# Patient Record
Sex: FEMALE | Race: WHITE | NOT HISPANIC OR LATINO | Employment: OTHER | URBAN - METROPOLITAN AREA
[De-identification: names, ages, dates, MRNs, and addresses within clinical notes are randomized per-mention and may not be internally consistent; named-entity substitution may affect disease eponyms.]

---

## 2021-02-14 ENCOUNTER — APPOINTMENT (OUTPATIENT)
Dept: GENERAL RADIOLOGY | Facility: CLINIC | Age: 66
End: 2021-02-14
Attending: FAMILY MEDICINE
Payer: COMMERCIAL

## 2021-02-14 ENCOUNTER — HOSPITAL ENCOUNTER (EMERGENCY)
Facility: CLINIC | Age: 66
Discharge: HOME | End: 2021-02-14
Attending: FAMILY MEDICINE | Admitting: FAMILY MEDICINE
Payer: COMMERCIAL

## 2021-02-14 VITALS
BODY MASS INDEX: 26.49 KG/M2 | RESPIRATION RATE: 16 BRPM | WEIGHT: 159 LBS | DIASTOLIC BLOOD PRESSURE: 67 MMHG | HEART RATE: 71 BPM | SYSTOLIC BLOOD PRESSURE: 132 MMHG | HEIGHT: 65 IN

## 2021-02-14 DIAGNOSIS — S39.012A STRAIN OF LUMBAR REGION, INITIAL ENCOUNTER: ICD-10-CM

## 2021-02-14 DIAGNOSIS — S70.01XA CONTUSION OF RIGHT HIP, INITIAL ENCOUNTER: ICD-10-CM

## 2021-02-14 PROCEDURE — 99285 EMERGENCY DEPT VISIT HI MDM: CPT | Mod: 25 | Performed by: FAMILY MEDICINE

## 2021-02-14 PROCEDURE — 73502 X-RAY EXAM HIP UNI 2-3 VIEWS: CPT

## 2021-02-14 PROCEDURE — 72100 X-RAY EXAM L-S SPINE 2/3 VWS: CPT

## 2021-02-14 PROCEDURE — 99285 EMERGENCY DEPT VISIT HI MDM: CPT | Performed by: FAMILY MEDICINE

## 2021-02-14 PROCEDURE — 96376 TX/PRO/DX INJ SAME DRUG ADON: CPT | Performed by: FAMILY MEDICINE

## 2021-02-14 PROCEDURE — 250N000011 HC RX IP 250 OP 636: Performed by: FAMILY MEDICINE

## 2021-02-14 PROCEDURE — 96374 THER/PROPH/DIAG INJ IV PUSH: CPT | Performed by: FAMILY MEDICINE

## 2021-02-14 PROCEDURE — 96375 TX/PRO/DX INJ NEW DRUG ADDON: CPT | Performed by: FAMILY MEDICINE

## 2021-02-14 RX ORDER — HYDROCODONE BITARTRATE AND ACETAMINOPHEN 5; 325 MG/1; MG/1
1 TABLET ORAL EVERY 6 HOURS PRN
Qty: 10 TABLET | Refills: 0 | Status: SHIPPED | OUTPATIENT
Start: 2021-02-14 | End: 2021-02-17

## 2021-02-14 RX ORDER — KETOROLAC TROMETHAMINE 15 MG/ML
15 INJECTION, SOLUTION INTRAMUSCULAR; INTRAVENOUS ONCE
Status: COMPLETED | OUTPATIENT
Start: 2021-02-14 | End: 2021-02-14

## 2021-02-14 RX ORDER — ONDANSETRON 2 MG/ML
4 INJECTION INTRAMUSCULAR; INTRAVENOUS EVERY 30 MIN PRN
Status: DISCONTINUED | OUTPATIENT
Start: 2021-02-14 | End: 2021-02-14 | Stop reason: HOSPADM

## 2021-02-14 RX ORDER — CYCLOBENZAPRINE HCL 5 MG
5 TABLET ORAL 3 TIMES DAILY PRN
Qty: 20 TABLET | Refills: 0 | Status: SHIPPED | OUTPATIENT
Start: 2021-02-14 | End: 2021-02-20

## 2021-02-14 RX ORDER — HYDROMORPHONE HYDROCHLORIDE 1 MG/ML
0.5 INJECTION, SOLUTION INTRAMUSCULAR; INTRAVENOUS; SUBCUTANEOUS
Status: COMPLETED | OUTPATIENT
Start: 2021-02-14 | End: 2021-02-14

## 2021-02-14 RX ADMIN — HYDROMORPHONE HYDROCHLORIDE 0.5 MG: 1 INJECTION, SOLUTION INTRAMUSCULAR; INTRAVENOUS; SUBCUTANEOUS at 12:37

## 2021-02-14 RX ADMIN — ONDANSETRON 4 MG: 2 INJECTION INTRAMUSCULAR; INTRAVENOUS at 11:50

## 2021-02-14 RX ADMIN — HYDROMORPHONE HYDROCHLORIDE 0.5 MG: 1 INJECTION, SOLUTION INTRAMUSCULAR; INTRAVENOUS; SUBCUTANEOUS at 11:53

## 2021-02-14 RX ADMIN — HYDROMORPHONE HYDROCHLORIDE 0.5 MG: 1 INJECTION, SOLUTION INTRAMUSCULAR; INTRAVENOUS; SUBCUTANEOUS at 12:07

## 2021-02-14 RX ADMIN — KETOROLAC TROMETHAMINE 15 MG: 15 INJECTION, SOLUTION INTRAMUSCULAR; INTRAVENOUS at 11:50

## 2021-02-14 ASSESSMENT — MIFFLIN-ST. JEOR: SCORE: 1267.1

## 2021-02-14 NOTE — ED PROVIDER NOTES
"  History     Chief Complaint   Patient presents with     Fall     HPI  Bozena Chicas is a 65 year old female who presents with right hip and low back pain after a fall.  Patient fell down about 4 steps.  She slipped.  She is having more pain in the back part of her hip and then lower back area.  She feels like initially she got the wind knocked out of her and she had nausea initially but denies any now.  She states when she takes a deep breath makes her lower back hurt more.  She was able to stand when she arrived here.  Denies any extremity numbness or tingling.  Patient has not had previous problems with that hip.  Patient describes the pain as achy    Allergies:  No Known Allergies    Problem List:    There are no active problems to display for this patient.       Past Medical History:    Past Medical History:   Diagnosis Date     Migraines        Past Surgical History:    History reviewed. No pertinent surgical history.    Family History:    History reviewed. No pertinent family history.    Social History:  Marital Status:    Social History     Tobacco Use     Smoking status: Never Smoker     Smokeless tobacco: Never Used   Substance Use Topics     Alcohol use: Yes     Comment: minimal     Drug use: Never        Medications:    No current outpatient medications on file.        Review of Systems   All other systems reviewed and are negative.      Physical Exam   Height: 165.1 cm (5' 5\")  Weight: 72.1 kg (159 lb)      Physical Exam  Vitals signs and nursing note reviewed.   Constitutional:       General: She is not in acute distress.     Appearance: She is well-developed. She is not diaphoretic.   HENT:      Head: Normocephalic and atraumatic.      Nose: Nose normal.      Mouth/Throat:      Pharynx: No oropharyngeal exudate.   Eyes:      Conjunctiva/sclera: Conjunctivae normal.   Neck:      Musculoskeletal: Normal range of motion and neck supple.   Cardiovascular:      Rate and Rhythm: Normal rate and " regular rhythm.      Heart sounds: Normal heart sounds. No murmur. No friction rub.   Pulmonary:      Effort: Pulmonary effort is normal. No respiratory distress.      Breath sounds: Normal breath sounds. No stridor. No wheezing or rales.   Abdominal:      General: Bowel sounds are normal. There is no distension.      Palpations: Abdomen is soft. There is no mass.      Tenderness: There is no abdominal tenderness. There is no guarding.   Musculoskeletal:      Right hip: She exhibits decreased range of motion and tenderness. She exhibits normal strength, no bony tenderness, no swelling, no crepitus, no deformity and no laceration.      Lumbar back: She exhibits decreased range of motion, tenderness, bony tenderness and pain. She exhibits no swelling, no edema, no deformity, no laceration, no spasm and normal pulse.        Back:    Skin:     General: Skin is warm and dry.      Capillary Refill: Capillary refill takes less than 2 seconds.      Findings: No erythema.   Neurological:      Mental Status: She is alert and oriented to person, place, and time.   Psychiatric:         Judgment: Judgment normal.         ED Course        Procedures      Results for orders placed or performed during the hospital encounter of 02/14/21 (from the past 24 hour(s))   XR Pelvis w Hip Right 1 View    Narrative    EXAM: XR PELVIS AND HIP RIGHT 1 VIEW  LOCATION: [facility]  DATE/TIME: 2/14/2021 12:09 PM    INDICATION: Fall, right, posterior hip and back pain  COMPARISON: None.      Impression    IMPRESSION: No acute fracture or malalignment. Minimal to mild degenerative changes throughout the bony pelvis and lower lumbar spine.   XR Lumbar Spine 2/3 Views    Narrative    XR LUMBAR SPINE 2-3 VIEWS 2/14/2021 12:30 PM     COMPARISON: None    HISTORY: Fall. Low back pain and hip pain    FINDINGS: No fracture. Normal vertebral body heights and alignment.  Normal interbody spaces and facets. Normal visualized bony  pelvis.    NAV MARINA MD       Medications   HYDROmorphone (PF) (DILAUDID) injection 0.5 mg (has no administration in time range)   ondansetron (ZOFRAN) injection 4 mg (has no administration in time range)   ketorolac (TORADOL) injection 15 mg (has no administration in time range)     X-ray of the hip and lumbar spine were unremarkable.  Patient was able to stand and bear weight when she first got here.  Her pain is improved after the pain meds as noted above.  I think patient most likely has more of a contusion to her hip and a strain of her back.  We will discharge the patient home with a few hydrocodone and Flexeril.  Patient will follow-up if there is no improvement over the next few days    Assessments & Plan (with Medical Decision Making)  Right hip contusion, lumbar strain     I have reviewed the nursing notes.    I have reviewed the findings, diagnosis, plan and need for follow up with the patient.              2/14/2021   [facility] EMERGENCY DEPT     Sergei Reza MD  02/14/21 5208

## 2024-03-21 ENCOUNTER — APPOINTMENT (OUTPATIENT)
Dept: GENERAL RADIOLOGY | Facility: CLINIC | Age: 69
End: 2024-03-21
Attending: FAMILY MEDICINE
Payer: COMMERCIAL

## 2024-03-21 ENCOUNTER — HOSPITAL ENCOUNTER (EMERGENCY)
Facility: CLINIC | Age: 69
Discharge: HOME | End: 2024-03-21
Attending: PHYSICIAN ASSISTANT | Admitting: PHYSICIAN ASSISTANT
Payer: COMMERCIAL

## 2024-03-21 ENCOUNTER — APPOINTMENT (OUTPATIENT)
Dept: GENERAL RADIOLOGY | Facility: CLINIC | Age: 69
End: 2024-03-21
Attending: PHYSICIAN ASSISTANT
Payer: COMMERCIAL

## 2024-03-21 VITALS
TEMPERATURE: 97.7 F | RESPIRATION RATE: 20 BRPM | WEIGHT: 154.6 LBS | DIASTOLIC BLOOD PRESSURE: 92 MMHG | BODY MASS INDEX: 25.76 KG/M2 | HEART RATE: 69 BPM | OXYGEN SATURATION: 99 % | HEIGHT: 65 IN | SYSTOLIC BLOOD PRESSURE: 170 MMHG

## 2024-03-21 DIAGNOSIS — S52.502A CLOSED FRACTURE OF LEFT DISTAL RADIUS AND ULNA: ICD-10-CM

## 2024-03-21 DIAGNOSIS — S52.602A CLOSED FRACTURE OF LEFT DISTAL RADIUS AND ULNA: ICD-10-CM

## 2024-03-21 PROCEDURE — 250N000013 HC RX MED GY IP 250 OP 250 PS 637: Performed by: PHYSICIAN ASSISTANT

## 2024-03-21 PROCEDURE — 25605 CLTX DST RDL FX/EPHYS SEP W/: CPT | Mod: 54 | Performed by: PHYSICIAN ASSISTANT

## 2024-03-21 PROCEDURE — 99284 EMERGENCY DEPT VISIT MOD MDM: CPT | Mod: 57 | Performed by: PHYSICIAN ASSISTANT

## 2024-03-21 PROCEDURE — 99284 EMERGENCY DEPT VISIT MOD MDM: CPT | Mod: 25 | Performed by: PHYSICIAN ASSISTANT

## 2024-03-21 PROCEDURE — 250N000011 HC RX IP 250 OP 636: Performed by: PHYSICIAN ASSISTANT

## 2024-03-21 PROCEDURE — 73110 X-RAY EXAM OF WRIST: CPT | Mod: LT

## 2024-03-21 PROCEDURE — 25605 CLTX DST RDL FX/EPHYS SEP W/: CPT | Mod: LT | Performed by: PHYSICIAN ASSISTANT

## 2024-03-21 PROCEDURE — 73100 X-RAY EXAM OF WRIST: CPT | Mod: LT

## 2024-03-21 RX ORDER — BUPIVACAINE HYDROCHLORIDE 5 MG/ML
10 INJECTION, SOLUTION PERINEURAL ONCE
Status: COMPLETED | OUTPATIENT
Start: 2024-03-21 | End: 2024-03-21

## 2024-03-21 RX ORDER — ACETAMINOPHEN 325 MG/1
975 TABLET ORAL ONCE
Status: COMPLETED | OUTPATIENT
Start: 2024-03-21 | End: 2024-03-21

## 2024-03-21 RX ADMIN — ACETAMINOPHEN 975 MG: 325 TABLET, FILM COATED ORAL at 18:45

## 2024-03-21 RX ADMIN — BUPIVACAINE HYDROCHLORIDE 50 MG: 5 INJECTION, SOLUTION EPIDURAL; INTRACAUDAL; PERINEURAL at 19:34

## 2024-03-21 ASSESSMENT — ACTIVITIES OF DAILY LIVING (ADL)
ADLS_ACUITY_SCORE: 35

## 2024-03-21 ASSESSMENT — COLUMBIA-SUICIDE SEVERITY RATING SCALE - C-SSRS
2. HAVE YOU ACTUALLY HAD ANY THOUGHTS OF KILLING YOURSELF IN THE PAST MONTH?: NO
6. HAVE YOU EVER DONE ANYTHING, STARTED TO DO ANYTHING, OR PREPARED TO DO ANYTHING TO END YOUR LIFE?: NO
1. IN THE PAST MONTH, HAVE YOU WISHED YOU WERE DEAD OR WISHED YOU COULD GO TO SLEEP AND NOT WAKE UP?: NO

## 2024-03-21 NOTE — ED TRIAGE NOTES
Was playing soccer with her grandkids, fell and hurt her left wrist.      Triage Assessment (Adult)       Row Name 03/21/24 1826          Triage Assessment    Airway WDL WDL        Respiratory WDL    Respiratory WDL WDL        Skin Circulation/Temperature WDL    Skin Circulation/Temperature WDL WDL        Cardiac WDL    Cardiac WDL WDL        Peripheral/Neurovascular WDL    Peripheral Neurovascular WDL WDL        Cognitive/Neuro/Behavioral WDL    Cognitive/Neuro/Behavioral WDL WDL

## 2024-03-21 NOTE — ED PROVIDER NOTES
"  History     Chief Complaint   Patient presents with    Wrist Pain     HPI  Bozena Chicas is a 68 year old female who presents for evaluation of falling on outstretched hand just prior to arrival.  Noted immediate onset of left wrist pain and swelling.  Pain is currently rated 9 on a scale of 10.  Has not taken anything for the pain.  States that she has fractured her wrist 2 times in the past.  Denies any numbness or tingling.  Denies injury elsewhere in her body.  Denies any head or neck injury.  No LOC.    Allergies:  No Known Allergies    Problem List:    There are no problems to display for this patient.       Past Medical History:    Past Medical History:   Diagnosis Date    Migraines        Past Surgical History:    History reviewed. No pertinent surgical history.    Family History:    No family history on file.    Social History:  Marital Status:   [2]  Social History     Tobacco Use    Smoking status: Never    Smokeless tobacco: Never   Substance Use Topics    Alcohol use: Yes     Comment: minimal    Drug use: Never        Medications:    No current outpatient medications on file.        Review of Systems   All other systems reviewed and are negative.      Physical Exam   BP: (!) 170/92  Pulse: 69  Temp: 97.7  F (36.5  C)  Resp: 20  Height: 165.1 cm (5' 5\")  Weight: 70.1 kg (154 lb 9.6 oz) (actual)  SpO2: 99 %      Physical Exam  Vitals and nursing note reviewed.   Constitutional:       General: She is not in acute distress.     Appearance: She is not diaphoretic.   HENT:      Head: Normocephalic and atraumatic.      Right Ear: External ear normal.      Left Ear: External ear normal.      Nose: Nose normal.      Mouth/Throat:      Pharynx: No oropharyngeal exudate.   Eyes:      General: No scleral icterus.        Right eye: No discharge.         Left eye: No discharge.      Conjunctiva/sclera: Conjunctivae normal.      Pupils: Pupils are equal, round, and reactive to light.   Neck:      " Thyroid: No thyromegaly.   Cardiovascular:      Rate and Rhythm: Normal rate and regular rhythm.      Heart sounds: Normal heart sounds. No murmur heard.  Pulmonary:      Effort: Pulmonary effort is normal. No respiratory distress.      Breath sounds: Normal breath sounds. No wheezing or rales.   Chest:      Chest wall: No tenderness.   Abdominal:      General: Bowel sounds are normal. There is no distension.      Palpations: Abdomen is soft. There is no mass.      Tenderness: There is no abdominal tenderness. There is no guarding or rebound.   Musculoskeletal:         General: Normal range of motion.      Cervical back: Normal range of motion and neck supple.      Comments: Swelling over the distal radius area and tenderness noted.  Pain with any range of motion.  No obvious deformity.  Hand and fingers nontender.  Cap refill less than 2 seconds.  Sensation intact light touch.  No tenderness of the mid/proximal forearm, elbow, upper arm, or shoulder.   Lymphadenopathy:      Cervical: No cervical adenopathy.   Skin:     General: Skin is warm and dry.      Capillary Refill: Capillary refill takes less than 2 seconds.      Findings: No erythema or rash.   Neurological:      Mental Status: She is alert and oriented to person, place, and time.      Cranial Nerves: No cranial nerve deficit.   Psychiatric:         Behavior: Behavior normal.         Thought Content: Thought content normal.         ED [facility]    -Fracture    Date/Time: 3/21/2024 10:38 PM    Performed by: Lizandro Milligan PA-C  Authorized by: Lizandro Milligan PA-C    Risks, benefits and alternatives discussed.      INJURY      Injury location:  Forearm    Forearm injury location:  L forearm    Forearm fracture type: distal radius and ulnar styloid      PRE PROCEDURE ASSESSMENT      Neurological function: normal      Distal perfusion: normal      Range of motion: normal      ANESTHESIA (see MAR for  exact dosages)      Anesthesia method:  Nerve block    Block location:  Distal radius hematoma block    Block needle gauge:  25 G    Block anesthetic:  Bupivacaine 0.5% w/o epi    Block technique:  Hematoma block    Block injection procedure:  Anatomic landmarks identified, introduced needle, incremental injection and anatomic landmarks palpated    Block outcome:  Anesthesia achieved    PROCEDURE DETAILS:     Manipulation performed: yes      Skeletal traction used: yes      Reduction successful: yes      X-ray confirmed reduction: yes      Immobilization:  Sling and splint (Ortho-Glass splint was molded by myself to support the fracture appropriately with dorsal pressure)    Splint type:  Double sugar tong    Supplies used:  Ortho-Glass and cotton padding    POST PROCEDURE ASSESSMENT      Neurological function: normal      Distal perfusion: normal      Range of motion: unchanged        PROCEDURE    Patient Tolerance:  Patient tolerated the procedure well with no immediate complications                Critical Care time:  none               Results for orders placed or performed during the hospital encounter of 03/21/24 (from the past 24 hour(s))   XR Wrist Left G/E 3 Views    Narrative    EXAM: XR WRIST LEFT G/E 3 VIEWS  LOCATION: [facility]  DATE: 3/21/2024    INDICATION: Trauma, pain.    COMPARISON: None.      Impression    IMPRESSION: Fracture of the distal aspect of the left radius with slight dorsal angulation of the distal fracture fragment, but no cortical step-off along the articular margin. Nondisplaced fracture of the ulnar styloid. Mild degenerative change at the   STT joint and 1st CMC joint.     XR Wrist Left 2 Views    Narrative    EXAM: XR WRIST LEFT 2 VIEWS  LOCATION: [facility]  DATE: 3/21/2024    INDICATION: post reduction, distal radius ulna fx  COMPARISON: 03/21/2024      Impression    IMPRESSION: Improved alignment of the fracture  of the distal aspect of the left radius seen on the old study. Stable fracture of the ulnar styloid. No carpal fractures.       Medications   acetaminophen (TYLENOL) tablet 975 mg (975 mg Oral $Given 3/21/24 1845)   BUPivacaine (MARCAINE) 0.5% injection MDV (50 mg Intradermal $Given 3/21/24 1934)       Assessments & Plan (with Medical Decision Making)  Closed fracture of left distal radius and ulna     68 year old female presents for evaluation of a fall on outstretched hand just prior to arrival with immediate onset of left wrist pain and swelling.  Pain is rated 9 on a scale of 10.  On exam there is swelling but no obvious deformity of the distal radius area.  No tenderness about the remainder of the hand, forearm, elbow, or upper arm.  Pulses and sensation intact.  She was given Tylenol for pain.  X-rays displayed dorsally displaced distal radius fracture with ulnar styloid nondisplaced component.  I performed independent review of the x-rays and agree with the findings.  I printed a copy of the x-ray and reviewed with the patient.  I then consulted with orthopedics, Dr. Gifford, who recommended that I perform a hematoma block and try and reduce this into more of an acceptable anatomical location.  He wanted to see the patient the following day for recheck in his clinic.  Please see procedure note above.  Hematoma block was performed with great success.  Reduction performed by myself with the aid of the ED tech.  Ortho-Glass sugar-tong splint was placed by myself in the ED tech and then molded appropriately to support the fracture.  Patient tolerated this well.  CMS intact afterwards.  Postreduction x-ray confirms much improved anatomical alignment of the fracture.  Patient's pain was much improved as well with Tylenol therapy.  I offered her pain medication for her at home, but she declined.  She thought she would do fine with ibuprofen and Tylenol.  The patient was set up with a follow-up visit with Dr. Gifford  tomorrow to recheck her condition and to discuss long-term management plan of her fracture.  ED return instructions reviewed with her in detail.  She was in agreement and the patient was suitable for discharge.  Sling provided.     I have reviewed the nursing notes.    I have reviewed the findings, diagnosis, plan and need for follow up with the patient.           Medical Decision Making  The patient's presentation was of moderate complexity (an acute complicated injury).    The patient's evaluation involved:  ordering and/or review of 2 test(s) in this encounter (see separate area of note for details)  discussion of management or test interpretation with another health professional (see separate area of note for details)    The patient's management necessitated moderate risk (prescription drug management including medications given in the ED) and high risk (a decision regarding emergency major procedure (displaced fracture reduction)).        There are no discharge medications for this patient.      Final diagnoses:   Closed fracture of left distal radius and ulna     Disclaimer: This note consists of symbols derived from keyboarding, dictation and/or voice recognition software. As a result, there may be errors in the script that have gone undetected. Please consider this when interpreting information found in this chart.      3/21/2024   [facility] EMERGENCY DEPT       Lizandro Milligan PA-C  03/21/24 9010

## 2024-03-22 ENCOUNTER — OFFICE VISIT (OUTPATIENT)
Dept: ORTHOPEDICS | Facility: CLINIC | Age: 69
End: 2024-03-22
Payer: COMMERCIAL

## 2024-03-22 VITALS
HEIGHT: 65 IN | HEART RATE: 56 BPM | DIASTOLIC BLOOD PRESSURE: 66 MMHG | WEIGHT: 154 LBS | BODY MASS INDEX: 25.66 KG/M2 | SYSTOLIC BLOOD PRESSURE: 100 MMHG

## 2024-03-22 DIAGNOSIS — S52.602A CLOSED FRACTURE OF DISTAL ENDS OF LEFT RADIUS AND ULNA, INITIAL ENCOUNTER: Primary | ICD-10-CM

## 2024-03-22 DIAGNOSIS — S52.502A CLOSED FRACTURE OF DISTAL ENDS OF LEFT RADIUS AND ULNA, INITIAL ENCOUNTER: Primary | ICD-10-CM

## 2024-03-22 DIAGNOSIS — E58 INADEQUATE INTAKE OF CALCIUM AND VITAMIN D: ICD-10-CM

## 2024-03-22 DIAGNOSIS — E55.9 INADEQUATE INTAKE OF CALCIUM AND VITAMIN D: ICD-10-CM

## 2024-03-22 DIAGNOSIS — M84.68XA PATHOLOGICAL FRACTURE IN OTHER DISEASE, OTHER SITE, INITIAL ENCOUNTER FOR FRACTURE: ICD-10-CM

## 2024-03-22 LAB — VIT D+METAB SERPL-MCNC: 56 NG/ML (ref 20–50)

## 2024-03-22 PROCEDURE — 82306 VITAMIN D 25 HYDROXY: CPT | Performed by: ORTHOPAEDIC SURGERY

## 2024-03-22 PROCEDURE — 36415 COLL VENOUS BLD VENIPUNCTURE: CPT | Performed by: ORTHOPAEDIC SURGERY

## 2024-03-22 PROCEDURE — 99203 OFFICE O/P NEW LOW 30 MIN: CPT | Mod: 57 | Performed by: ORTHOPAEDIC SURGERY

## 2024-03-22 PROCEDURE — 25600 CLTX DST RDL FX/EPHYS SEP WO: CPT | Mod: LT | Performed by: ORTHOPAEDIC SURGERY

## 2024-03-22 RX ORDER — CALCIUM CARBONATE/VITAMIN D3 500MG-5MCG
1 POWDER IN PACKET (EA) ORAL DAILY
COMMUNITY

## 2024-03-22 RX ORDER — SUMATRIPTAN 100 MG/1
100 TABLET, FILM COATED ORAL
COMMUNITY
Start: 2024-03-15

## 2024-03-22 RX ORDER — ACETAMINOPHEN 500 MG
500-1000 TABLET ORAL EVERY 6 HOURS PRN
COMMUNITY

## 2024-03-22 RX ORDER — OMEGA-3 FATTY ACIDS/FISH OIL 300-1000MG
600 CAPSULE ORAL EVERY 6 HOURS PRN
COMMUNITY

## 2024-03-22 ASSESSMENT — PAIN SCALES - GENERAL: PAINLEVEL: SEVERE PAIN (7)

## 2024-03-22 NOTE — DISCHARGE INSTRUCTIONS
It was a pleasure working with you today!  I hope your condition improves rapidly!     Please elevate your arm above heart level is much as possible.  You can ice through the splint for 20 minutes every couple hours.  It is okay to use Tylenol 1000 mg every 6 hours as needed for pain.  You can also use ibuprofen 600 mg every 6 hours as needed for pain.  Please keep your appointment with the orthopedist for recheck of your fracture.

## 2024-03-22 NOTE — PROGRESS NOTES
S:  Sustained wrist fracture yesterday.  Reduced in ED.  Splinted.  Doing well in splint.       There is no problem list on file for this patient.           Past Medical History:   Diagnosis Date    Migraines           No past surgical history on file.         Social History     Tobacco Use    Smoking status: Never    Smokeless tobacco: Never   Substance Use Topics    Alcohol use: Yes     Comment: minimal          No family history on file.          No Known Allergies         No current outpatient medications on file.          Review Of Systems  Skin: negative  Eyes: negative  Ears/Nose/Throat: negative  Respiratory: No shortness of breath, dyspnea on exertion, cough, or hemoptysis    O: Physical Exam: sugar tong appears well moulded.  CMS intact to fingers.  No issues identified.    Lab: update Vit D    Images:EXAM: XR WRIST LEFT G/E 3 VIEWS  LOCATION: [facility]  DATE: 3/21/2024     INDICATION: Trauma, pain.     COMPARISON: None.                                                                      IMPRESSION: Fracture of the distal aspect of the left radius with slight dorsal angulation of the distal fracture fragment, but no cortical step-off along the articular margin. Nondisplaced fracture of the ulnar styloid. Mild degenerative change at the   STT joint and 1st CMC joint.     Narrative & Impression   EXAM: XR WRIST LEFT 2 VIEWS  LOCATION: [facility]  DATE: 3/21/2024     INDICATION: post reduction, distal radius ulna fx  COMPARISON: 03/21/2024                                                                      IMPRESSION: Improved alignment of the fracture of the distal aspect of the left radius seen on the old study. Stable fracture of the ulnar styloid. No carpal fractures            A:  Intra articular L distal radial fx/ ulnar fx      P:  Patient reduced in ER and splinted with adequate orientation, obtain images in splint next visit, then  remove splint in a week, place in short arm well moulded cast and repeat images after cast change.  Notify if exacerbation symptoms  Discussed Vit D and Calcium supplementation  Consider DEXA update             In addition to the above assessment and plan each active problem on Bozena's problem list was evaluated today. This included the questioning of Bozena for any medication problems. We will continue the current treatment plan for these active problems except as noted.

## 2024-03-22 NOTE — LETTER
3/22/2024         RE: Bozena Chicas  [id number] [address] [place] [id number]        Dear Colleague,    Thank you for referring your patient, Bozena Chicas, to the [facility]. Please see a copy of my visit note below.    S:  Sustained wrist fracture yesterday.  Reduced in ED.  Splinted.  Doing well in splint.       There is no problem list on file for this patient.           Past Medical History:   Diagnosis Date     Migraines           No past surgical history on file.         Social History     Tobacco Use     Smoking status: Never     Smokeless tobacco: Never   Substance Use Topics     Alcohol use: Yes     Comment: minimal          No family history on file.          No Known Allergies         No current outpatient medications on file.          Review Of Systems  Skin: negative  Eyes: negative  Ears/Nose/Throat: negative  Respiratory: No shortness of breath, dyspnea on exertion, cough, or hemoptysis    O: Physical Exam: sugar tong appears well moulded.  CMS intact to fingers.  No issues identified.    Lab: update Vit D    Images:EXAM: XR WRIST LEFT G/E 3 VIEWS  LOCATION: [facility]  DATE: 3/21/2024     INDICATION: Trauma, pain.     COMPARISON: None.                                                                      IMPRESSION: Fracture of the distal aspect of the left radius with slight dorsal angulation of the distal fracture fragment, but no cortical step-off along the articular margin. Nondisplaced fracture of the ulnar styloid. Mild degenerative change at the   STT joint and 1st CMC joint.     Narrative & Impression   EXAM: XR WRIST LEFT 2 VIEWS  LOCATION: [facility]  DATE: 3/21/2024     INDICATION: post reduction, distal radius ulna fx  COMPARISON: 03/21/2024                                                                      IMPRESSION: Improved alignment of the fracture of the distal aspect of the  left radius seen on the old study. Stable fracture of the ulnar styloid. No carpal fractures            A:  Intra articular L distal radial fx/ ulnar fx      P:  Patient reduced in ER and splinted with adequate orientation, obtain images in splint next visit, then remove splint in a week, place in short arm well moulded cast and repeat images after cast change.  Notify if exacerbation symptoms  Discussed Vit D and Calcium supplementation  Consider DEXA update             In addition to the above assessment and plan each active problem on Bozena's problem list was evaluated today. This included the questioning of Bozena for any medication problems. We will continue the current treatment plan for these active problems except as noted.        Again, thank you for allowing me to participate in the care of your patient.        Sincerely,        Benjie Gifford MD

## 2024-03-29 ENCOUNTER — ANCILLARY PROCEDURE (OUTPATIENT)
Dept: GENERAL RADIOLOGY | Facility: CLINIC | Age: 69
End: 2024-03-29
Attending: ORTHOPAEDIC SURGERY
Payer: COMMERCIAL

## 2024-03-29 ENCOUNTER — OFFICE VISIT (OUTPATIENT)
Dept: ORTHOPEDICS | Facility: CLINIC | Age: 69
End: 2024-03-29
Payer: COMMERCIAL

## 2024-03-29 VITALS — RESPIRATION RATE: 16 BRPM | BODY MASS INDEX: 25.66 KG/M2 | WEIGHT: 154 LBS | HEIGHT: 65 IN

## 2024-03-29 DIAGNOSIS — S52.602A CLOSED FRACTURE OF DISTAL ENDS OF LEFT RADIUS AND ULNA, INITIAL ENCOUNTER: ICD-10-CM

## 2024-03-29 DIAGNOSIS — S52.502A CLOSED FRACTURE OF DISTAL ENDS OF LEFT RADIUS AND ULNA, INITIAL ENCOUNTER: ICD-10-CM

## 2024-03-29 DIAGNOSIS — S52.602A CLOSED FRACTURE OF DISTAL ENDS OF LEFT RADIUS AND ULNA, INITIAL ENCOUNTER: Primary | ICD-10-CM

## 2024-03-29 DIAGNOSIS — S52.502A CLOSED FRACTURE OF DISTAL ENDS OF LEFT RADIUS AND ULNA, INITIAL ENCOUNTER: Primary | ICD-10-CM

## 2024-03-29 PROCEDURE — 99207 PR FRACTURE CARE IN GLOBAL PERIOD: CPT | Performed by: ORTHOPAEDIC SURGERY

## 2024-03-29 PROCEDURE — 73100 X-RAY EXAM OF WRIST: CPT | Mod: TC | Performed by: RADIOLOGY

## 2024-03-29 ASSESSMENT — PAIN SCALES - GENERAL: PAINLEVEL: MODERATE PAIN (4)

## 2024-03-29 NOTE — PATIENT INSTRUCTIONS
Thank you for choosing [facility]!      FOLLOW-UP  Dr. Gifford would like you to follow-up in 5-6 weeks for a cast removal and new imaging. Please stop by the  on your way out or call [phone] to schedule.     Casted today.

## 2024-03-29 NOTE — LETTER
3/29/2024         RE: Bozena Chicas  [address] [address] [place] [id number]        Dear Colleague,    Thank you for referring your patient, Bozena Chicas, to the [facility]. Please see a copy of my visit note below.    S:  Doing well in splint following fracture reduction.  DEXA scheduled.  Taking Vit D and calcium supplementation.       There is no problem list on file for this patient.           Past Medical History:   Diagnosis Date     Migraines           No past surgical history on file.         Social History     Tobacco Use     Smoking status: Never     Smokeless tobacco: Never   Substance Use Topics     Alcohol use: Yes     Comment: minimal          No family history on file.          No Known Allergies         Current Outpatient Medications   Medication Sig Dispense Refill     acetaminophen (TYLENOL) 500 MG tablet Take 500-1,000 mg by mouth every 6 hours as needed for mild pain       Calcium Carb-Cholecalciferol (OYSTER SHELL CALCIUM/VITAMIN D) 500-5 MG-MCG PACK Take 1 tablet by mouth daily       ibuprofen (ADVIL/MOTRIN) 200 MG capsule Take 600 mg by mouth every 6 hours as needed       omeprazole (PRILOSEC) 20 MG DR capsule Take 20 mg by mouth every morning       SUMAtriptan (IMITREX) 100 MG tablet Take 100 mg by mouth at onset of headache            Review Of Systems  Skin: negative  Eyes: negative  Ears/Nose/Throat: negative  Respiratory: No shortness of breath, dyspnea on exertion, cough, or hemoptysis    O: Physical Exam:  No pressure necrosis, no excoriation about margins of splint.  CMS intact to hand.  LUE.    Lab:Vit D 56    Images:Nearly anatomic orientation fracture fragments.  Some dorsal tilt, some radial angulation.  All acceptable.         A:    Stable L Distal radial fracture after closed reduction and placement in cast.    P:  Splint changed to short arm cast.  Post cast change images show adequate orientation fracture fragments.  See back 5-6  weeks, remove cast and obtain new images fracture site.    Notify if exacerbation symptoms  Elevate, ice  Continue Vit D and calcium supplementation  DEXA and review next visit             In addition to the above assessment and plan each active problem on Bozena's problem list was evaluated today. This included the questioning of Bozena for any medication problems. We will continue the current treatment plan for these active problems except as noted.        Again, thank you for allowing me to participate in the care of your patient.        Sincerely,        Benjie Gifford MD

## 2024-03-29 NOTE — PROGRESS NOTES
S:  Doing well in splint following fracture reduction.  DEXA scheduled.  Taking Vit D and calcium supplementation.       There is no problem list on file for this patient.           Past Medical History:   Diagnosis Date    Migraines           No past surgical history on file.         Social History     Tobacco Use    Smoking status: Never    Smokeless tobacco: Never   Substance Use Topics    Alcohol use: Yes     Comment: minimal          No family history on file.          No Known Allergies         Current Outpatient Medications   Medication Sig Dispense Refill    acetaminophen (TYLENOL) 500 MG tablet Take 500-1,000 mg by mouth every 6 hours as needed for mild pain      Calcium Carb-Cholecalciferol (OYSTER SHELL CALCIUM/VITAMIN D) 500-5 MG-MCG PACK Take 1 tablet by mouth daily      ibuprofen (ADVIL/MOTRIN) 200 MG capsule Take 600 mg by mouth every 6 hours as needed      omeprazole (PRILOSEC) 20 MG DR capsule Take 20 mg by mouth every morning      SUMAtriptan (IMITREX) 100 MG tablet Take 100 mg by mouth at onset of headache            Review Of Systems  Skin: negative  Eyes: negative  Ears/Nose/Throat: negative  Respiratory: No shortness of breath, dyspnea on exertion, cough, or hemoptysis    O: Physical Exam:  No pressure necrosis, no excoriation about margins of splint.  CMS intact to hand.  LUE.    Lab:Vit D 56    Images:Nearly anatomic orientation fracture fragments.  Some dorsal tilt, some radial angulation.  All acceptable.         A:    Stable L Distal radial fracture after closed reduction and placement in cast.    P:  Splint changed to short arm cast.  Post cast change images show adequate orientation fracture fragments.  See back 5-6 weeks, remove cast and obtain new images fracture site.    Notify if exacerbation symptoms  Elevate, ice  Continue Vit D and calcium supplementation  DEXA and review next visit             In addition to the above assessment and plan each active problem on Bozena's problem  list was evaluated today. This included the questioning of Bozena for any medication problems. We will continue the current treatment plan for these active problems except as noted.

## 2024-04-04 ENCOUNTER — HOSPITAL ENCOUNTER (OUTPATIENT)
Dept: BONE DENSITY | Facility: CLINIC | Age: 69
Discharge: HOME | End: 2024-04-04
Attending: ORTHOPAEDIC SURGERY | Admitting: ORTHOPAEDIC SURGERY
Payer: COMMERCIAL

## 2024-04-04 DIAGNOSIS — M84.68XA PATHOLOGICAL FRACTURE IN OTHER DISEASE, OTHER SITE, INITIAL ENCOUNTER FOR FRACTURE: ICD-10-CM

## 2024-04-04 DIAGNOSIS — S52.502A CLOSED FRACTURE OF DISTAL ENDS OF LEFT RADIUS AND ULNA, INITIAL ENCOUNTER: ICD-10-CM

## 2024-04-04 DIAGNOSIS — S52.602A CLOSED FRACTURE OF DISTAL ENDS OF LEFT RADIUS AND ULNA, INITIAL ENCOUNTER: ICD-10-CM

## 2024-04-04 PROCEDURE — 77080 DXA BONE DENSITY AXIAL: CPT

## 2024-04-13 ENCOUNTER — NURSE TRIAGE (OUTPATIENT)
Dept: NURSING | Facility: CLINIC | Age: 69
End: 2024-04-13
Payer: COMMERCIAL

## 2024-04-13 NOTE — TELEPHONE ENCOUNTER
"Triage Call:    Caller: Patient  Broke wrist 3 weeks ago, has had permanent cast for 2 weeks.  Her fingers are swollen and the cast feels tight. AT times they are a little on the \"white\" side and still a green color.    The coloring has not gotten worse and the puffiness has always been there, but hasn't gone down.  At times \"feels like the cast is so tight my arm is going to explode\".        Protocol Recommended Disposition: Be seen in the next 4 hours.      Caller verbalized understanding of instructions and questions answered.      Daisy Rutledge RN on 4/13/2024 at 3:04 PM      Reason for Disposition   [1] Increasing pain under cast AND [2] cast put on > 24 hours ago AND [3] not improved after elevation    Additional Information   Negative: Chest pain   Negative: Difficulty breathing   Negative: [1] SEVERE pain (e.g., excruciating, pain scale 8-10) under cast AND [2] not improved after pain medications and elevation   Negative: [1] SEVERE pain of fingers or toes AND [2] not improved after pain medications and elevation   Negative: [1] Numbness (loss of sensation) of fingers or toes AND [2] persists after 1 hour of elevation   Negative: [1] Tingling (pins and needles sensation) of fingers or toes AND [2] persists after 1 hour of elevation   Negative: Blueness or pallor of fingers or toes (compared to non-injured side)   Negative: Patient sounds very sick or weak to the triager    Protocols used: Cast Symptoms and [id number]    " Outgoing call to patient today 10/6/22 at 11:45am to the patient to go over detailed surgery instructions.     Discussed the following surgery directions with Cleo.    Surgery - Laparoscopic Hiatal Hernia Repair, possible Nissen, possible Gastropexy  Scheduled - Monday 10/31/22  [facility] - Friday 10/28/22 at 9:00am at [facility], [address] [place] [address].   PO appointment made on Monday 11/14/22 at 1:00pm with BROOK Berkowitz    STOP taking Ibuprofen, Advil, Motrin, Aleve, Naproxen and Nuprin 7 days prior to surgery.  STOP taking ALL prescription / OTC vitamins/supplements, minerals, fish oil and herbs 7 days prior to surgery.  STOP taking ALL diet pills 10 days prior to surgery.    Nothing to eat or drink after midnight the night prior to surgery.    Cleo stated that she is feeling better today and asked what she should do if she does feel better prior to surgery. She was instructed to contact Dr Braga's nurse to discuss what direction to go in.     MD's and assistant's [place] has been updated  Paperwork will be given to the nurse      Letters to be mailed to the patient's mailing address.  Verified address.  Letters will be put in the outgoing mail at the .      Cleo is agreeable to the surgery information and will contact our office with any questions or concerns prior to surgery and thanked me for the call.   she has been declining over the last several years and now has difficulty swallowing. Daughters do not want any feeding tube and understands the natural course.

## 2024-04-15 ENCOUNTER — TELEPHONE (OUTPATIENT)
Dept: ORTHOPEDICS | Facility: CLINIC | Age: 69
End: 2024-04-15
Payer: COMMERCIAL

## 2024-04-15 NOTE — TELEPHONE ENCOUNTER
Other: pt called, broke wrist 03/21, pt has cast on x 3 weeks, pt c/o of fingers being still swollen, no tingling. Would like for care team to call her back.      Could we send this information to you in [facility] or would you prefer to receive a phone call?:   Patient would prefer a phone call   Okay to leave a detailed message?: Yes at Cell number on file:    Telephone Information:   Mobile [phone]

## 2024-04-15 NOTE — TELEPHONE ENCOUNTER
Patient contacted in regards to questions about swelling in affected wrist. Patient states capillary refill is <3 seconds. Patient does report swelling that has not gone down since injury and reports mild pain with curling fingers but denies cast issues such as the cast being too tight around the fingers and arm. Patient states she has been elevating by laying her arm on a pillow but is encouraged to raise arm above heart when awake for as long as she is able and to use ice on fingers. Patient advised to call back in AM if condition does not change or go to ER if condition worsens overnight. Patient verbalizes understanding. No further action needed at this time.     Virginia Sanders RN   [facility]

## 2024-05-13 ENCOUNTER — ANCILLARY PROCEDURE (OUTPATIENT)
Dept: GENERAL RADIOLOGY | Facility: CLINIC | Age: 69
End: 2024-05-13
Attending: ORTHOPAEDIC SURGERY
Payer: COMMERCIAL

## 2024-05-13 ENCOUNTER — OFFICE VISIT (OUTPATIENT)
Dept: ORTHOPEDICS | Facility: CLINIC | Age: 69
End: 2024-05-13
Payer: COMMERCIAL

## 2024-05-13 VITALS — SYSTOLIC BLOOD PRESSURE: 132 MMHG | DIASTOLIC BLOOD PRESSURE: 77 MMHG | OXYGEN SATURATION: 98 % | HEART RATE: 72 BPM

## 2024-05-13 DIAGNOSIS — S52.502A CLOSED FRACTURE OF DISTAL ENDS OF LEFT RADIUS AND ULNA, INITIAL ENCOUNTER: ICD-10-CM

## 2024-05-13 DIAGNOSIS — S52.602A CLOSED FRACTURE OF DISTAL ENDS OF LEFT RADIUS AND ULNA, INITIAL ENCOUNTER: ICD-10-CM

## 2024-05-13 DIAGNOSIS — M81.0 OSTEOPOROSIS, UNSPECIFIED OSTEOPOROSIS TYPE, UNSPECIFIED PATHOLOGICAL FRACTURE PRESENCE: ICD-10-CM

## 2024-05-13 DIAGNOSIS — T88.7XXS UNSPECIFIED ADVERSE EFFECT OF DRUG OR MEDICAMENT, SEQUELA (CODE): ICD-10-CM

## 2024-05-13 DIAGNOSIS — M80.8AXA OTHER OSTEOPOROSIS WITH CURRENT PATHOLOGICAL FRACTURE, OTHER SITE, INITIAL ENCOUNTER FOR FRACTURE: Primary | ICD-10-CM

## 2024-05-13 PROCEDURE — 99207 PR FRACTURE CARE IN GLOBAL PERIOD: CPT | Performed by: ORTHOPAEDIC SURGERY

## 2024-05-13 PROCEDURE — 73100 X-RAY EXAM OF WRIST: CPT | Mod: TC | Performed by: RADIOLOGY

## 2024-05-13 ASSESSMENT — PAIN SCALES - GENERAL: PAINLEVEL: MILD PAIN (3)

## 2024-05-13 NOTE — LETTER
5/13/2024         RE: Bozena Chicas  [address] [address] [place] [id number]        Dear Colleague,    Thank you for referring your patient, Bozena Chicas, to the [facility]. Please see a copy of my visit note below.    S:  Patient without complaints. Has had DEXA.  Is taking Vit D and calcium supplementation.  Also wanted to discuss Left foot bunion.       There is no problem list on file for this patient.           Past Medical History:   Diagnosis Date     Distal radius fracture 03/21/2024     Migraines           History reviewed. No pertinent surgical history.         Social History     Tobacco Use     Smoking status: Never     Smokeless tobacco: Never   Substance Use Topics     Alcohol use: Yes     Comment: minimal          History reviewed. No pertinent family history.          No Known Allergies         Current Outpatient Medications   Medication Sig Dispense Refill     acetaminophen (TYLENOL) 500 MG tablet Take 500-1,000 mg by mouth every 6 hours as needed for mild pain       Calcium Carb-Cholecalciferol (OYSTER SHELL CALCIUM/VITAMIN D) 500-5 MG-MCG PACK Take 1 tablet by mouth daily       ibuprofen (ADVIL/MOTRIN) 200 MG capsule Take 600 mg by mouth every 6 hours as needed       omeprazole (PRILOSEC) 20 MG DR capsule Take 20 mg by mouth every morning       SUMAtriptan (IMITREX) 100 MG tablet Take 100 mg by mouth at onset of headache            Review Of Systems  Skin: negative  Eyes: negative  Ears/Nose/Throat: negative  Respiratory: No shortness of breath, dyspnea on exertion, cough, or hemoptysis    O: Physical Exam: Edema and resolving ecchymosis LUE.  CMS intact LUE.  No pressure necrosis, excoriation about margins of cast.     Lab: Vit D 56    Images:  Narrative & Impression   EXAM: DX AXIAL HIPS/SPINE  LOCATION: [facility]  DATE: 4/4/2024     INDICATION: BMD screening, baseline exam.  DEMOGRAPHICS: Age- 68 years. Gender- Female.  Menopausal status- Postmenopausal.  COMPARISON: No prior studies available on the current scanner.  TECHNIQUE: Dual-energy x-ray absorptiometry (DXA) performed with routine technique.     FINDINGS:     DXA RESULTS  -Lumbar Spine: L1-L4: BMD: 0.843 g/cm2. T-score: -2.8. Z-score: -1.3.  -RIGHT Hip Total: BMD: 0.638 g/cm2. T-score: -2.9. Z-score: -1.7.  -RIGHT Hip Femoral neck: BMD: 0.614 g/cm2. T-score: -3.0. Z-score: -1.5.  -LEFT Hip Total: BMD: 0.637 g/cm2. T-score: -2.9. Z-score: -1.7.  -LEFT Hip Femoral neck: BMD: 0.604 g/cm2. T-score: -3.1. Z-score: -1.6.     WHO T-SCORE CRITERIA  -Normal: T score at or above -1 SD  -Osteopenia: T score between -1 and -2.5 SD  -Osteoporosis: T score at or below -2.5 SD     The World Health Organization (WHO) criteria is applicable to perimenopausal females, postmenopausal females, and men aged 50 years or older.     INTERVAL CHANGE  -No prior studies available on the current scanner for comparison.       FRACTURE RISK  -The FRAX risk calculator is not applicable due to osteoporosis.     RECOMMENDATIONS  The patient's BMD is consistent with osteoporosis, and he/she is at increased fracture risk. If not currently being treated for low BMD, this would merit treatment according to the Bone Health and Osteoporosis Foundation.                                                                      IMPRESSION: OSTEOPOROSIS. T score meets the WHO criteria for osteoporosis at one or more measured sites. The risk of osteoporotic fracture increases approximately two-fold for each standard deviation decrease in T-score.         WRIST LEFT TWO VIEWS 3/29/2024 10:13 AM     HISTORY: Closed fracture of distal ends of left radius and ulna,  initial encounter.                                                                      IMPRESSION: Healing comminuted mildly displaced intra-articular  fracture of the distal left radius. Healing nondisplaced fracture of  the ulnar styloid process. Osteopenia.      Images of wrist fracture today without much change compared to previous study.    A:  osteoporosis, Healing comminuted mildly displaced intra-articular  fracture of the distal left radius. Healing nondisplaced fracture of  the ulnar styloid process.      P:  Patient will use a brace she has at home,   See PT/OT in [place] (at pt's request)  Continue vit D and calcium supplementation  Treat for osteoporosis/ bone health clinic  Will request Prolia and she will discuss with primary care  See back 6 weeks with new images  Notify if exacerbation symptoms             In addition to the above assessment and plan each active problem on Bozena's problem list was evaluated today. This included the questioning of Bozena for any medication problems. We will continue the current treatment plan for these active problems except as noted.        Again, thank you for allowing me to participate in the care of your patient.        Sincerely,        Benjie Gifford MD

## 2024-05-13 NOTE — PROGRESS NOTES
S:  Patient without complaints. Has had DEXA.  Is taking Vit D and calcium supplementation.  Also wanted to discuss Left foot bunion.       There is no problem list on file for this patient.           Past Medical History:   Diagnosis Date    Distal radius fracture 03/21/2024    Migraines           History reviewed. No pertinent surgical history.         Social History     Tobacco Use    Smoking status: Never    Smokeless tobacco: Never   Substance Use Topics    Alcohol use: Yes     Comment: minimal          History reviewed. No pertinent family history.          No Known Allergies         Current Outpatient Medications   Medication Sig Dispense Refill    acetaminophen (TYLENOL) 500 MG tablet Take 500-1,000 mg by mouth every 6 hours as needed for mild pain      Calcium Carb-Cholecalciferol (OYSTER SHELL CALCIUM/VITAMIN D) 500-5 MG-MCG PACK Take 1 tablet by mouth daily      ibuprofen (ADVIL/MOTRIN) 200 MG capsule Take 600 mg by mouth every 6 hours as needed      omeprazole (PRILOSEC) 20 MG DR capsule Take 20 mg by mouth every morning      SUMAtriptan (IMITREX) 100 MG tablet Take 100 mg by mouth at onset of headache            Review Of Systems  Skin: negative  Eyes: negative  Ears/Nose/Throat: negative  Respiratory: No shortness of breath, dyspnea on exertion, cough, or hemoptysis    O: Physical Exam: Edema and resolving ecchymosis LUE.  CMS intact LUE.  No pressure necrosis, excoriation about margins of cast.     Lab: Vit D 56    Images:  Narrative & Impression   EXAM: DX AXIAL HIPS/SPINE  LOCATION: [facility]  DATE: 4/4/2024     INDICATION: BMD screening, baseline exam.  DEMOGRAPHICS: Age- 68 years. Gender- Female. Menopausal status- Postmenopausal.  COMPARISON: No prior studies available on the current scanner.  TECHNIQUE: Dual-energy x-ray absorptiometry (DXA) performed with routine technique.     FINDINGS:     DXA RESULTS  -Lumbar Spine: L1-L4: BMD: 0.843 g/cm2. T-score: -2.8.  Z-score: -1.3.  -RIGHT Hip Total: BMD: 0.638 g/cm2. T-score: -2.9. Z-score: -1.7.  -RIGHT Hip Femoral neck: BMD: 0.614 g/cm2. T-score: -3.0. Z-score: -1.5.  -LEFT Hip Total: BMD: 0.637 g/cm2. T-score: -2.9. Z-score: -1.7.  -LEFT Hip Femoral neck: BMD: 0.604 g/cm2. T-score: -3.1. Z-score: -1.6.     WHO T-SCORE CRITERIA  -Normal: T score at or above -1 SD  -Osteopenia: T score between -1 and -2.5 SD  -Osteoporosis: T score at or below -2.5 SD     The World Health Organization (WHO) criteria is applicable to perimenopausal females, postmenopausal females, and men aged 50 years or older.     INTERVAL CHANGE  -No prior studies available on the current scanner for comparison.       FRACTURE RISK  -The FRAX risk calculator is not applicable due to osteoporosis.     RECOMMENDATIONS  The patient's BMD is consistent with osteoporosis, and he/she is at increased fracture risk. If not currently being treated for low BMD, this would merit treatment according to the Bone Health and Osteoporosis Foundation.                                                                      IMPRESSION: OSTEOPOROSIS. T score meets the WHO criteria for osteoporosis at one or more measured sites. The risk of osteoporotic fracture increases approximately two-fold for each standard deviation decrease in T-score.         WRIST LEFT TWO VIEWS 3/29/2024 10:13 AM     HISTORY: Closed fracture of distal ends of left radius and ulna,  initial encounter.                                                                      IMPRESSION: Healing comminuted mildly displaced intra-articular  fracture of the distal left radius. Healing nondisplaced fracture of  the ulnar styloid process. Osteopenia.     Images of wrist fracture today without much change compared to previous study.    A:  osteoporosis, Healing comminuted mildly displaced intra-articular  fracture of the distal left radius. Healing nondisplaced fracture of  the ulnar styloid process.      P:  Patient  will use a brace she has at home,   See PT/OT in [place] (at pt's request)  Continue vit D and calcium supplementation  Treat for osteoporosis/ bone health clinic  Will request Prolia and she will discuss with primary care  See back 6 weeks with new images  Notify if exacerbation symptoms             In addition to the above assessment and plan each active problem on Bozena's problem list was evaluated today. This included the questioning of Bozena for any medication problems. We will continue the current treatment plan for these active problems except as noted.

## 2024-05-20 ENCOUNTER — TELEPHONE (OUTPATIENT)
Dept: ORTHOPEDICS | Facility: CLINIC | Age: 69
End: 2024-05-20
Payer: COMMERCIAL

## 2024-05-20 NOTE — TELEPHONE ENCOUNTER
VM left for patient to call clinic back to schedule ordered labs. Patient needs these labs prior to Prolia injection.     Virginia Sanders RN   [facility]

## 2024-05-23 ENCOUNTER — THERAPY VISIT (OUTPATIENT)
Dept: OCCUPATIONAL THERAPY | Facility: CLINIC | Age: 69
End: 2024-05-23
Attending: ORTHOPAEDIC SURGERY
Payer: COMMERCIAL

## 2024-05-23 DIAGNOSIS — S52.502A CLOSED FRACTURE OF DISTAL ENDS OF LEFT RADIUS AND ULNA, INITIAL ENCOUNTER: Primary | ICD-10-CM

## 2024-05-23 DIAGNOSIS — S52.602A CLOSED FRACTURE OF DISTAL ENDS OF LEFT RADIUS AND ULNA, INITIAL ENCOUNTER: Primary | ICD-10-CM

## 2024-05-23 PROCEDURE — 97110 THERAPEUTIC EXERCISES: CPT | Mod: GO | Performed by: OCCUPATIONAL THERAPIST

## 2024-05-23 PROCEDURE — 97140 MANUAL THERAPY 1/> REGIONS: CPT | Mod: GO | Performed by: OCCUPATIONAL THERAPIST

## 2024-05-23 PROCEDURE — 97165 OT EVAL LOW COMPLEX 30 MIN: CPT | Mod: GO | Performed by: OCCUPATIONAL THERAPIST

## 2024-05-28 ENCOUNTER — THERAPY VISIT (OUTPATIENT)
Dept: OCCUPATIONAL THERAPY | Facility: CLINIC | Age: 69
End: 2024-05-28
Attending: ORTHOPAEDIC SURGERY
Payer: COMMERCIAL

## 2024-05-28 DIAGNOSIS — S52.502A CLOSED FRACTURE OF DISTAL ENDS OF LEFT RADIUS AND ULNA, INITIAL ENCOUNTER: Primary | ICD-10-CM

## 2024-05-28 DIAGNOSIS — S52.602A CLOSED FRACTURE OF DISTAL ENDS OF LEFT RADIUS AND ULNA, INITIAL ENCOUNTER: Primary | ICD-10-CM

## 2024-05-28 PROCEDURE — 97140 MANUAL THERAPY 1/> REGIONS: CPT | Mod: GO | Performed by: OCCUPATIONAL THERAPIST

## 2024-05-28 PROCEDURE — 97110 THERAPEUTIC EXERCISES: CPT | Mod: GO | Performed by: OCCUPATIONAL THERAPIST

## 2024-06-03 NOTE — TELEPHONE ENCOUNTER
Patient declines Prolia at this time due to side effects. Dr. Gifford aware.     Virginia Sanders, RN   [facility]

## 2024-06-06 ENCOUNTER — THERAPY VISIT (OUTPATIENT)
Dept: OCCUPATIONAL THERAPY | Facility: CLINIC | Age: 69
End: 2024-06-06
Attending: ORTHOPAEDIC SURGERY
Payer: COMMERCIAL

## 2024-06-06 DIAGNOSIS — S52.502A CLOSED FRACTURE OF DISTAL ENDS OF LEFT RADIUS AND ULNA, INITIAL ENCOUNTER: Primary | ICD-10-CM

## 2024-06-06 DIAGNOSIS — S52.602A CLOSED FRACTURE OF DISTAL ENDS OF LEFT RADIUS AND ULNA, INITIAL ENCOUNTER: Primary | ICD-10-CM

## 2024-06-06 PROCEDURE — 97140 MANUAL THERAPY 1/> REGIONS: CPT | Mod: GO | Performed by: OCCUPATIONAL THERAPIST

## 2024-06-06 PROCEDURE — 97110 THERAPEUTIC EXERCISES: CPT | Mod: GO | Performed by: OCCUPATIONAL THERAPIST

## 2024-06-10 ENCOUNTER — THERAPY VISIT (OUTPATIENT)
Dept: OCCUPATIONAL THERAPY | Facility: CLINIC | Age: 69
End: 2024-06-10
Attending: ORTHOPAEDIC SURGERY
Payer: COMMERCIAL

## 2024-06-10 DIAGNOSIS — S52.602A CLOSED FRACTURE OF DISTAL ENDS OF LEFT RADIUS AND ULNA, INITIAL ENCOUNTER: ICD-10-CM

## 2024-06-10 DIAGNOSIS — S52.502A CLOSED FRACTURE OF DISTAL ENDS OF LEFT RADIUS AND ULNA, INITIAL ENCOUNTER: ICD-10-CM

## 2024-06-10 PROCEDURE — 97110 THERAPEUTIC EXERCISES: CPT | Mod: GO | Performed by: OCCUPATIONAL THERAPIST

## 2024-06-10 PROCEDURE — 97140 MANUAL THERAPY 1/> REGIONS: CPT | Mod: GO | Performed by: OCCUPATIONAL THERAPIST

## 2024-06-10 PROCEDURE — 97112 NEUROMUSCULAR REEDUCATION: CPT | Mod: GO | Performed by: OCCUPATIONAL THERAPIST

## 2024-06-17 ENCOUNTER — THERAPY VISIT (OUTPATIENT)
Dept: OCCUPATIONAL THERAPY | Facility: CLINIC | Age: 69
End: 2024-06-17
Attending: ORTHOPAEDIC SURGERY
Payer: COMMERCIAL

## 2024-06-17 DIAGNOSIS — S52.502A CLOSED FRACTURE OF DISTAL ENDS OF LEFT RADIUS AND ULNA, INITIAL ENCOUNTER: Primary | ICD-10-CM

## 2024-06-17 DIAGNOSIS — S52.602A CLOSED FRACTURE OF DISTAL ENDS OF LEFT RADIUS AND ULNA, INITIAL ENCOUNTER: Primary | ICD-10-CM

## 2024-06-17 PROCEDURE — 97140 MANUAL THERAPY 1/> REGIONS: CPT | Mod: GO | Performed by: OCCUPATIONAL THERAPIST

## 2024-06-17 PROCEDURE — 97110 THERAPEUTIC EXERCISES: CPT | Mod: GO | Performed by: OCCUPATIONAL THERAPIST

## 2024-06-19 ENCOUNTER — OFFICE VISIT (OUTPATIENT)
Dept: ORTHOPEDICS | Facility: CLINIC | Age: 69
End: 2024-06-19
Payer: COMMERCIAL

## 2024-06-19 ENCOUNTER — ANCILLARY PROCEDURE (OUTPATIENT)
Dept: GENERAL RADIOLOGY | Facility: CLINIC | Age: 69
End: 2024-06-19
Attending: ORTHOPAEDIC SURGERY
Payer: COMMERCIAL

## 2024-06-19 VITALS
DIASTOLIC BLOOD PRESSURE: 58 MMHG | SYSTOLIC BLOOD PRESSURE: 115 MMHG | TEMPERATURE: 99.1 F | WEIGHT: 155.4 LBS | BODY MASS INDEX: 25.86 KG/M2

## 2024-06-19 DIAGNOSIS — M81.0 OSTEOPOROSIS, UNSPECIFIED OSTEOPOROSIS TYPE, UNSPECIFIED PATHOLOGICAL FRACTURE PRESENCE: ICD-10-CM

## 2024-06-19 DIAGNOSIS — M81.0 OSTEOPOROSIS, UNSPECIFIED OSTEOPOROSIS TYPE, UNSPECIFIED PATHOLOGICAL FRACTURE PRESENCE: Primary | ICD-10-CM

## 2024-06-19 PROCEDURE — 99207 PR FRACTURE CARE IN GLOBAL PERIOD: CPT | Performed by: ORTHOPAEDIC SURGERY

## 2024-06-19 PROCEDURE — 73100 X-RAY EXAM OF WRIST: CPT | Mod: TC | Performed by: RADIOLOGY

## 2024-06-19 ASSESSMENT — PAIN SCALES - GENERAL: PAINLEVEL: MODERATE PAIN (5)

## 2024-06-19 NOTE — LETTER
6/19/2024      Bozena Chicas  [address] [facility] [address]      Dear Colleague,    Thank you for referring your patient, Bozena Chicas, to the [facility]. Please see a copy of my visit note below.    S:         Patient Active Problem List   Diagnosis     Closed fracture of distal ends of left radius and ulna, initial encounter            Past Medical History:   Diagnosis Date     Distal radius fracture 03/21/2024     Migraines           No past surgical history on file.         Social History     Tobacco Use     Smoking status: Never     Smokeless tobacco: Never   Substance Use Topics     Alcohol use: Yes     Comment: minimal          No family history on file.          No Known Allergies         Current Outpatient Medications   Medication Sig Dispense Refill     acetaminophen (TYLENOL) 500 MG tablet Take 500-1,000 mg by mouth every 6 hours as needed for mild pain       Calcium Carb-Cholecalciferol (OYSTER SHELL CALCIUM/VITAMIN D) 500-5 MG-MCG PACK Take 1 tablet by mouth daily       ibuprofen (ADVIL/MOTRIN) 200 MG capsule Take 600 mg by mouth every 6 hours as needed       omeprazole (PRILOSEC) 20 MG DR capsule Take 20 mg by mouth every morning       SUMAtriptan (IMITREX) 100 MG tablet Take 100 mg by mouth at onset of headache            Review Of Systems  Skin: negative  Eyes: negative  Ears/Nose/Throat: negative  Respiratory: No shortness of breath, dyspnea on exertion, cough, or hemoptysis    O: Physical Exam:    Lab:vit D 56 (target 50-80)    Images:     EXAM: DX AXIAL HIPS/SPINE  LOCATION: [facility]  DATE: 4/4/2024     INDICATION: BMD screening, baseline exam.  DEMOGRAPHICS: Age- 68 years. Gender- Female. Menopausal status- Postmenopausal.  COMPARISON: No prior studies available on the current scanner.  TECHNIQUE: Dual-energy x-ray absorptiometry (DXA) performed with routine technique.     FINDINGS:     DXA RESULTS  -Lumbar Spine:  L1-L4: BMD: 0.843 g/cm2. T-score: -2.8. Z-score: -1.3.  -RIGHT Hip Total: BMD: 0.638 g/cm2. T-score: -2.9. Z-score: -1.7.  -RIGHT Hip Femoral neck: BMD: 0.614 g/cm2. T-score: -3.0. Z-score: -1.5.  -LEFT Hip Total: BMD: 0.637 g/cm2. T-score: -2.9. Z-score: -1.7.  -LEFT Hip Femoral neck: BMD: 0.604 g/cm2. T-score: -3.1. Z-score: -1.6.     WHO T-SCORE CRITERIA  -Normal: T score at or above -1 SD  -Osteopenia: T score between -1 and -2.5 SD  -Osteoporosis: T score at or below -2.5 SD     The World Health Organization (WHO) criteria is applicable to perimenopausal females, postmenopausal females, and men aged 50 years or older.     INTERVAL CHANGE  -No prior studies available on the current scanner for comparison.       FRACTURE RISK  -The FRAX risk calculator is not applicable due to osteoporosis.     RECOMMENDATIONS  The patient's BMD is consistent with osteoporosis, and he/she is at increased fracture risk. If not currently being treated for low BMD, this would merit treatment according to the Bone Health and Osteoporosis Foundation.                                                                      IMPRESSION: OSTEOPOROSIS. T score meets the WHO criteria for osteoporosis at one or more measured sites. The risk of osteoporotic fracture increases approximately two-fold for each standard deviation decrease in T-score.     A:  Osteoporosis with stable L distal radial fracture      P:  continue Vit D supplementation along with Calcium citrate (elemental calcium) 8060-0631 mg daily  Prolia  Continue to rehab hand/wrist  Notify if exacerbation symptoms  See back 6 mos  Repeat images wrist one year             In addition to the above assessment and plan each active problem on Bozena's problem list was evaluated today. This included the questioning of Bozena for any medication problems. We will continue the current treatment plan for these active problems except as noted.        Again, thank you for allowing me to  participate in the care of your patient.        Sincerely,        Benjie Gifford MD

## 2024-06-19 NOTE — PROGRESS NOTES
S:  Patient doing well and working with OT.  Decided against Prolia and thinks she well try Reclast. Will discuss with primary care.         Patient Active Problem List   Diagnosis    Closed fracture of distal ends of left radius and ulna, initial encounter            Past Medical History:   Diagnosis Date    Distal radius fracture 03/21/2024    Migraines           No past surgical history on file.         Social History     Tobacco Use    Smoking status: Never    Smokeless tobacco: Never   Substance Use Topics    Alcohol use: Yes     Comment: minimal          No family history on file.          No Known Allergies         Current Outpatient Medications   Medication Sig Dispense Refill    acetaminophen (TYLENOL) 500 MG tablet Take 500-1,000 mg by mouth every 6 hours as needed for mild pain      Calcium Carb-Cholecalciferol (OYSTER SHELL CALCIUM/VITAMIN D) 500-5 MG-MCG PACK Take 1 tablet by mouth daily      ibuprofen (ADVIL/MOTRIN) 200 MG capsule Take 600 mg by mouth every 6 hours as needed      omeprazole (PRILOSEC) 20 MG DR capsule Take 20 mg by mouth every morning      SUMAtriptan (IMITREX) 100 MG tablet Take 100 mg by mouth at onset of headache            Review Of Systems  Skin: negative  Eyes: negative  Ears/Nose/Throat: negative  Respiratory: No shortness of breath, dyspnea on exertion, cough, or hemoptysis    O: Physical Exam:Adequate wrist motion.  Some swelling yet.  CMS intact.    Lab:vit D 56 (target 50-80)    Images:     EXAM: DX AXIAL HIPS/SPINE  LOCATION: [facility]  DATE: 4/4/2024     INDICATION: BMD screening, baseline exam.  DEMOGRAPHICS: Age- 68 years. Gender- Female. Menopausal status- Postmenopausal.  COMPARISON: No prior studies available on the current scanner.  TECHNIQUE: Dual-energy x-ray absorptiometry (DXA) performed with routine technique.     FINDINGS:     DXA RESULTS  -Lumbar Spine: L1-L4: BMD: 0.843 g/cm2. T-score: -2.8. Z-score: -1.3.  -RIGHT Hip Total:  BMD: 0.638 g/cm2. T-score: -2.9. Z-score: -1.7.  -RIGHT Hip Femoral neck: BMD: 0.614 g/cm2. T-score: -3.0. Z-score: -1.5.  -LEFT Hip Total: BMD: 0.637 g/cm2. T-score: -2.9. Z-score: -1.7.  -LEFT Hip Femoral neck: BMD: 0.604 g/cm2. T-score: -3.1. Z-score: -1.6.     WHO T-SCORE CRITERIA  -Normal: T score at or above -1 SD  -Osteopenia: T score between -1 and -2.5 SD  -Osteoporosis: T score at or below -2.5 SD     The World Health Organization (WHO) criteria is applicable to perimenopausal females, postmenopausal females, and men aged 50 years or older.     INTERVAL CHANGE  -No prior studies available on the current scanner for comparison.       FRACTURE RISK  -The FRAX risk calculator is not applicable due to osteoporosis.     RECOMMENDATIONS  The patient's BMD is consistent with osteoporosis, and he/she is at increased fracture risk. If not currently being treated for low BMD, this would merit treatment according to the [employer] Health [employer] Osteopor[employer].                                                                      IMPRESSION: OSTEOPOROSIS. T score meets the WHO criteria for osteoporosis at one or more measured sites. The risk of osteoporotic fracture increases approximately two-fold for each standard deviation decrease in T-score.     A:  Osteoporosis with stable L distal radial fracture      P:  continue Vit D supplementation along with Calcium citrate (elemental calcium) 6118-3613 mg daily  Prolia suggested but she'd rather try reclast  Continue to rehab hand/wrist  Notify if exacerbation symptoms  See back one year unless issues then sooner  Repeat images wrist one year             In addition to the above assessment and plan each active problem on Bozena's problem list was evaluated today. This included the questioning of Bozena for any medication problems. We will continue the current treatment plan for these active problems except as noted.

## 2024-07-01 ENCOUNTER — THERAPY VISIT (OUTPATIENT)
Dept: OCCUPATIONAL THERAPY | Facility: CLINIC | Age: 69
End: 2024-07-01
Attending: ORTHOPAEDIC SURGERY
Payer: COMMERCIAL

## 2024-07-01 DIAGNOSIS — S52.502A CLOSED FRACTURE OF DISTAL ENDS OF LEFT RADIUS AND ULNA, INITIAL ENCOUNTER: Primary | ICD-10-CM

## 2024-07-01 DIAGNOSIS — S52.602A CLOSED FRACTURE OF DISTAL ENDS OF LEFT RADIUS AND ULNA, INITIAL ENCOUNTER: Primary | ICD-10-CM

## 2024-07-01 PROCEDURE — 97110 THERAPEUTIC EXERCISES: CPT | Mod: GO | Performed by: OCCUPATIONAL THERAPIST

## 2024-07-01 PROCEDURE — 97140 MANUAL THERAPY 1/> REGIONS: CPT | Mod: GO | Performed by: OCCUPATIONAL THERAPIST

## 2024-07-01 NOTE — PROGRESS NOTES
PLAN  Continue therapy per current plan of care.    Beginning/End Dates of Progress Note Reporting Period:  5/23/24 to 07/01/2024    Referring Provider:  Benjie HARTMANI [age]       07/01/24 0500   Appointment Info   Treating Provider Rosalina Blackwell, OTR/L, CHT   Total/Authorized Visits 12   Visits Used 6   Medical Diagnosis Closed fracture of distal ends of left radius and ulna, initial encounter   OT Tx Diagnosis Stiffness. Pain. Limited ROM.   Quick Add  Certification   Progress Note/Certification   Start Of Care Date 05/23/24   Onset of Illness/Injury or Date of Surgery 05/13/24   Therapy Frequency 1x/wk   Predicted Duration 12 weeks   Certification date from 05/23/24   Certification date to 08/15/24   KX Modifier Statement I certify the need for these services furnished under this plan of treatment and while under my care.  (Physician co-signature of this document indicates review and certification of the therapy plan)   Progress Note Due Date 08/15/24   Progress Note Completed Date 07/01/24   OT Goal 1   Goal Description Pt will increase AROM to 60/60 E/F for increased functional engagement in ADLs.   Rationale In order to maximize safety and independence with ADL/IADLs   Goal Progress not met, making progress towards   Target Date 08/15/24   Subjective Report   Subjective Report Played piano and just pain up the side of the arm.   Objective Measures   Objective Measures Hand Obj Measures;Objective Measure 1   Hand Objective Measures ROM   ROM Wrist ROM;Forearm ROM   Objective Measure 1   Objective Measure DWC edema   Details 16.7   Forearm ROM   Supination 90   Pronation  85   Wrist ROM    Extension 60  (65 post)   Flexion 30  (35 post. same)   Radial Deviation (RD) 15   Ulnar Deviation (UD) 20   Treatment Interventions (OT)   Interventions Manual Therapy;Therapeutic Procedure/Exercise;Neuromuscular Re-education   Neuromuscular Re-education   Neuro Re-ed 1 Proprioception   Neuro Re-ed 1 - Details  hammer, 1# TB  (rotation and DTM)   Skilled Intervention graded input into the wrist with controlled movement/motion   Patient Response/Progress well   Therapeutic Procedure/Exercise   Therapeutic Procedure: strength, endurance, ROM, flexibillity minutes (44866) 30   Therapeutic Procedures Ther Proc 2;Ther Proc 3;Ther Proc 4;Ther Proc 5;Ther Proc 6   Ther Proc 1 PROM   Ther Proc 1 - Details Wrist E/F/R/U   Ther Proc 2 Tendon glides   Ther Proc 2 - Details hook and flat, A/PROM   Ther Proc 4 MWM   Ther Proc 4 - Details table edge with facilitated mobility of PCR   Ther Proc 5 Isometrics   Ther Proc 5 - Details WE and RD with YTB  (reviewed for technique/mechanics)   Skilled Intervention graded activity to meet patient at current level and modify as needed   Patient Response/Progress well   Ther Proc 6 putty   Ther Proc 6 - Details , pinch, add, abd   Manual Therapy   Manual Therapy Minutes (66101) 15   Manual Therapy Manual Therapy 2   Manual Therapy 1 MEM, STM   Manual Therapy 1 - Details tissue mobilization throughout the FA to reduce edema and elongation to tissues.  (focus on flexion motion)   Manual Therapy 2 JMs   Manual Therapy 2 - Details individual and through PCR   Skilled Intervention anatomical knowledge of structures to reduce edema ad create length of tissues   Patient Response/Progress well   Education   Learner/Method Patient   Plan   Home program PTRx   Plan for next session ROM, PROM, stability, edema control. strength   Comments   Comments Pt with continued improvements in the wrist and hand, reports most discomfort in the hand vs wrist. She is increasing overall functional use of the hand with ADLs and IADLs, though she does reports increased edema at the end of the day. She conts to benefit from ongoing skilled therapy to address continued deficits in order to return to pain free independent function.   Total Session Time   Timed Code Treatment Minutes 45   Total Treatment Time (sum of timed  and untimed services) 45

## 2024-07-08 ENCOUNTER — THERAPY VISIT (OUTPATIENT)
Dept: OCCUPATIONAL THERAPY | Facility: CLINIC | Age: 69
End: 2024-07-08
Attending: ORTHOPAEDIC SURGERY
Payer: COMMERCIAL

## 2024-07-08 DIAGNOSIS — S52.602A CLOSED FRACTURE OF DISTAL ENDS OF LEFT RADIUS AND ULNA, INITIAL ENCOUNTER: Primary | ICD-10-CM

## 2024-07-08 DIAGNOSIS — S52.502A CLOSED FRACTURE OF DISTAL ENDS OF LEFT RADIUS AND ULNA, INITIAL ENCOUNTER: Primary | ICD-10-CM

## 2024-07-08 PROCEDURE — 97140 MANUAL THERAPY 1/> REGIONS: CPT | Mod: GO | Performed by: OCCUPATIONAL THERAPIST

## 2024-07-08 PROCEDURE — 97110 THERAPEUTIC EXERCISES: CPT | Mod: GO | Performed by: OCCUPATIONAL THERAPIST

## 2024-07-15 ENCOUNTER — THERAPY VISIT (OUTPATIENT)
Dept: OCCUPATIONAL THERAPY | Facility: CLINIC | Age: 69
End: 2024-07-15
Attending: ORTHOPAEDIC SURGERY
Payer: COMMERCIAL

## 2024-07-15 DIAGNOSIS — S52.502A CLOSED FRACTURE OF DISTAL ENDS OF LEFT RADIUS AND ULNA, INITIAL ENCOUNTER: Primary | ICD-10-CM

## 2024-07-15 DIAGNOSIS — S52.602A CLOSED FRACTURE OF DISTAL ENDS OF LEFT RADIUS AND ULNA, INITIAL ENCOUNTER: Primary | ICD-10-CM

## 2024-07-15 PROCEDURE — 97110 THERAPEUTIC EXERCISES: CPT | Mod: GO | Performed by: OCCUPATIONAL THERAPIST

## 2024-07-15 PROCEDURE — 97140 MANUAL THERAPY 1/> REGIONS: CPT | Mod: GO | Performed by: OCCUPATIONAL THERAPIST

## 2024-07-25 ENCOUNTER — THERAPY VISIT (OUTPATIENT)
Dept: OCCUPATIONAL THERAPY | Facility: CLINIC | Age: 69
End: 2024-07-25
Attending: ORTHOPAEDIC SURGERY
Payer: COMMERCIAL

## 2024-07-25 DIAGNOSIS — S52.502A CLOSED FRACTURE OF DISTAL ENDS OF LEFT RADIUS AND ULNA, INITIAL ENCOUNTER: Primary | ICD-10-CM

## 2024-07-25 DIAGNOSIS — S52.602A CLOSED FRACTURE OF DISTAL ENDS OF LEFT RADIUS AND ULNA, INITIAL ENCOUNTER: Primary | ICD-10-CM

## 2024-07-25 PROCEDURE — 97140 MANUAL THERAPY 1/> REGIONS: CPT | Mod: GO | Performed by: OCCUPATIONAL THERAPIST

## 2024-07-25 PROCEDURE — 97110 THERAPEUTIC EXERCISES: CPT | Mod: GO | Performed by: OCCUPATIONAL THERAPIST

## 2024-07-31 ENCOUNTER — THERAPY VISIT (OUTPATIENT)
Dept: OCCUPATIONAL THERAPY | Facility: CLINIC | Age: 69
End: 2024-07-31
Attending: ORTHOPAEDIC SURGERY
Payer: COMMERCIAL

## 2024-07-31 DIAGNOSIS — S52.502A CLOSED FRACTURE OF DISTAL ENDS OF LEFT RADIUS AND ULNA, INITIAL ENCOUNTER: Primary | ICD-10-CM

## 2024-07-31 DIAGNOSIS — S52.602A CLOSED FRACTURE OF DISTAL ENDS OF LEFT RADIUS AND ULNA, INITIAL ENCOUNTER: Primary | ICD-10-CM

## 2024-07-31 PROCEDURE — 97110 THERAPEUTIC EXERCISES: CPT | Mod: GO | Performed by: OCCUPATIONAL THERAPIST

## 2024-07-31 PROCEDURE — 97140 MANUAL THERAPY 1/> REGIONS: CPT | Mod: GO | Performed by: OCCUPATIONAL THERAPIST

## 2024-08-15 ENCOUNTER — THERAPY VISIT (OUTPATIENT)
Dept: OCCUPATIONAL THERAPY | Facility: CLINIC | Age: 69
End: 2024-08-15
Attending: ORTHOPAEDIC SURGERY
Payer: COMMERCIAL

## 2024-08-15 DIAGNOSIS — S52.502A CLOSED FRACTURE OF DISTAL ENDS OF LEFT RADIUS AND ULNA, INITIAL ENCOUNTER: Primary | ICD-10-CM

## 2024-08-15 DIAGNOSIS — S52.602A CLOSED FRACTURE OF DISTAL ENDS OF LEFT RADIUS AND ULNA, INITIAL ENCOUNTER: Primary | ICD-10-CM

## 2024-08-15 PROCEDURE — 97140 MANUAL THERAPY 1/> REGIONS: CPT | Mod: GO | Performed by: OCCUPATIONAL THERAPIST

## 2024-08-15 PROCEDURE — 97110 THERAPEUTIC EXERCISES: CPT | Mod: GO | Performed by: OCCUPATIONAL THERAPIST

## 2024-08-22 ENCOUNTER — THERAPY VISIT (OUTPATIENT)
Dept: OCCUPATIONAL THERAPY | Facility: CLINIC | Age: 69
End: 2024-08-22
Attending: ORTHOPAEDIC SURGERY
Payer: COMMERCIAL

## 2024-08-22 DIAGNOSIS — S52.602A CLOSED FRACTURE OF DISTAL ENDS OF LEFT RADIUS AND ULNA, INITIAL ENCOUNTER: Primary | ICD-10-CM

## 2024-08-22 DIAGNOSIS — S52.502A CLOSED FRACTURE OF DISTAL ENDS OF LEFT RADIUS AND ULNA, INITIAL ENCOUNTER: Primary | ICD-10-CM

## 2024-08-22 PROCEDURE — 97110 THERAPEUTIC EXERCISES: CPT | Mod: GO | Performed by: OCCUPATIONAL THERAPIST

## 2024-08-22 PROCEDURE — 97140 MANUAL THERAPY 1/> REGIONS: CPT | Mod: GO | Performed by: OCCUPATIONAL THERAPIST

## 2024-08-29 ENCOUNTER — THERAPY VISIT (OUTPATIENT)
Dept: OCCUPATIONAL THERAPY | Facility: CLINIC | Age: 69
End: 2024-08-29
Attending: ORTHOPAEDIC SURGERY
Payer: COMMERCIAL

## 2024-08-29 DIAGNOSIS — S52.502A CLOSED FRACTURE OF DISTAL ENDS OF LEFT RADIUS AND ULNA, INITIAL ENCOUNTER: Primary | ICD-10-CM

## 2024-08-29 DIAGNOSIS — S52.602A CLOSED FRACTURE OF DISTAL ENDS OF LEFT RADIUS AND ULNA, INITIAL ENCOUNTER: Primary | ICD-10-CM

## 2024-08-29 PROCEDURE — 97110 THERAPEUTIC EXERCISES: CPT | Mod: GO | Performed by: OCCUPATIONAL THERAPIST

## 2024-09-11 ENCOUNTER — THERAPY VISIT (OUTPATIENT)
Dept: OCCUPATIONAL THERAPY | Facility: CLINIC | Age: 69
End: 2024-09-11
Attending: ORTHOPAEDIC SURGERY
Payer: COMMERCIAL

## 2024-09-11 DIAGNOSIS — S52.502A CLOSED FRACTURE OF DISTAL ENDS OF LEFT RADIUS AND ULNA, INITIAL ENCOUNTER: Primary | ICD-10-CM

## 2024-09-11 DIAGNOSIS — S52.602A CLOSED FRACTURE OF DISTAL ENDS OF LEFT RADIUS AND ULNA, INITIAL ENCOUNTER: Primary | ICD-10-CM

## 2024-09-11 PROCEDURE — 97110 THERAPEUTIC EXERCISES: CPT | Mod: GO | Performed by: OCCUPATIONAL THERAPIST

## 2024-09-11 PROCEDURE — 97112 NEUROMUSCULAR REEDUCATION: CPT | Mod: GO | Performed by: OCCUPATIONAL THERAPIST

## 2024-09-16 ENCOUNTER — THERAPY VISIT (OUTPATIENT)
Dept: OCCUPATIONAL THERAPY | Facility: CLINIC | Age: 69
End: 2024-09-16
Attending: ORTHOPAEDIC SURGERY
Payer: COMMERCIAL

## 2024-09-16 DIAGNOSIS — S52.502A CLOSED FRACTURE OF DISTAL ENDS OF LEFT RADIUS AND ULNA, INITIAL ENCOUNTER: Primary | ICD-10-CM

## 2024-09-16 DIAGNOSIS — S52.602A CLOSED FRACTURE OF DISTAL ENDS OF LEFT RADIUS AND ULNA, INITIAL ENCOUNTER: Primary | ICD-10-CM

## 2024-09-16 PROCEDURE — 97110 THERAPEUTIC EXERCISES: CPT | Mod: GO | Performed by: OCCUPATIONAL THERAPIST

## 2024-09-16 NOTE — PROGRESS NOTES
DISCHARGE  Reason for Discharge: Patient has met all goals.    Discharge Plan: Patient to continue home program.    Referring Provider:  Benjie Gifford    UEFI 78/80       09/16/24 0500   Appointment Info   Treating Provider Rosalina Blackwell, OTR/L, CHT   Total/Authorized Visits 12   Visits Used 16   Medical Diagnosis Closed fracture of distal ends of left radius and ulna, initial encounter   OT Tx Diagnosis Stiffness. Pain. Limited ROM.   Progress Note/Certification   Start Of Care Date 05/23/24   Onset of Illness/Injury or Date of Surgery 05/13/24   Therapy Frequency 1x/wk   Predicted Duration 12 weeks   Certification date from 08/15/24   Certification date to 10/10/24   KX Modifier Statement I certify the need for these services furnished under this plan of treatment and while under my care.  (Physician co-signature of this document indicates review and certification of the therapy plan)   Progress Note Due Date 10/10/24   Progress Note Completed Date 08/15/24   OT Goal 1   Goal Description Pt will increase AROM to 60/60 E/F for increased functional engagement in ADLs.   Rationale In order to maximize safety and independence with ADL/IADLs   Goal Progress not met for flexion, but she is functional   Target Date 08/15/24   Date Met 09/16/24   Subjective Report   Subjective Report I feel  like I've turned a corner and I was able to play the piano for 10 minutes without much pain.   Objective Measures   Objective Measures Hand Obj Measures;Objective Measure 1   Hand Objective Measures ROM;Strength   ROM Wrist ROM;Forearm ROM   Strength ;Lateral Pinch;3 Point Pinch   Objective Measure 1   Objective Measure DWC edema   Details 16.5   Forearm ROM   Supination 90   Pronation  85   Wrist ROM    Extension 70   Flexion 40   Radial Deviation (RD) 20   Ulnar Deviation (UD) 30    (measured in pounds)    Average Strength 22R 50L  (NT)   Lateral Pinch (measured in pounds)   Lateral Pinch Average Strength 13L 14R  (NT)    3 Point Pinch (measured in pounds)   3 Point Pinch Average Strength 10L 13R  (NT)   Treatment Interventions (OT)   Interventions Manual Therapy;Therapeutic Procedure/Exercise;Neuromuscular Re-education   Neuromuscular Re-education   Neuro Re-ed 1 proprioception   Neuro Re-ed 1 - Details theraball on wall, fingertip push ups   Therapeutic Procedure/Exercise   Therapeutic Procedure: strength, endurance, ROM, flexibillity minutes (34397) 25   Therapeutic Procedures Ther Proc 2;Ther Proc 3;Ther Proc 4;Ther Proc 5;Ther Proc 6;Ther Proc 7   Ther Proc 1 PROM   Ther Proc 1 - Details Wrist E/F/R/U  (table top)   Ther Proc 2 Theraball   Ther Proc 2 - Details  and lifts, RD/UD, DTM   Ther Proc 4 MWM   Ther Proc 4 - Details table edge with facilitated mobility of PCR   Ther Proc 5 flexbar   Ther Proc 5 - Details happy, sad, twists   Skilled Intervention graded activity to meet patient at current level and modify as needed   Patient Response/Progress well   Manual Therapy   Manual Therapy Minutes (26285) 10   Manual Therapy Manual Therapy 8   Manual Therapy 2 JMs   Manual Therapy 2 - Details individual and through PCR   Skilled Intervention anatomical knowledge of structures to reduce edema ad create length of tissues   Patient Response/Progress well   Education   Learner/Method Patient   Plan   Home program PTRx   Updates to plan of care d/c   Comments   Comments Pt with decreased overall pain levels this weeka nd no longer with pain in the fingers. She feels at this time she is ready to d/c to HEP which is very appropriate at this time. Overall, gains made in ROM, strength and functional activities. Pt will d/c to [facility]P.   Total Session Time   Timed Code Treatment Minutes 35   Total Treatment Time (sum of timed and untimed services) 35

## 2025-04-26 ENCOUNTER — HEALTH MAINTENANCE LETTER (OUTPATIENT)
Age: 70
End: 2025-04-26